# Patient Record
Sex: FEMALE | Race: BLACK OR AFRICAN AMERICAN | NOT HISPANIC OR LATINO | Employment: FULL TIME | URBAN - METROPOLITAN AREA
[De-identification: names, ages, dates, MRNs, and addresses within clinical notes are randomized per-mention and may not be internally consistent; named-entity substitution may affect disease eponyms.]

---

## 2023-03-22 ENCOUNTER — OFFICE VISIT (OUTPATIENT)
Dept: URGENT CARE | Facility: URGENT CARE | Age: 38
End: 2023-03-22
Payer: COMMERCIAL

## 2023-03-22 VITALS
DIASTOLIC BLOOD PRESSURE: 77 MMHG | SYSTOLIC BLOOD PRESSURE: 109 MMHG | OXYGEN SATURATION: 100 % | HEART RATE: 98 BPM | TEMPERATURE: 102.2 F | WEIGHT: 190 LBS

## 2023-03-22 DIAGNOSIS — R50.9 FEVER, UNSPECIFIED FEVER CAUSE: ICD-10-CM

## 2023-03-22 DIAGNOSIS — J10.1 INFLUENZA B: ICD-10-CM

## 2023-03-22 DIAGNOSIS — J02.0 STREP THROAT: Primary | ICD-10-CM

## 2023-03-22 LAB
DEPRECATED S PYO AG THROAT QL EIA: POSITIVE
FLUAV AG SPEC QL IA: NEGATIVE
FLUBV AG SPEC QL IA: POSITIVE

## 2023-03-22 PROCEDURE — 87880 STREP A ASSAY W/OPTIC: CPT | Performed by: PHYSICIAN ASSISTANT

## 2023-03-22 PROCEDURE — U0005 INFEC AGEN DETEC AMPLI PROBE: HCPCS | Performed by: PHYSICIAN ASSISTANT

## 2023-03-22 PROCEDURE — 99204 OFFICE O/P NEW MOD 45 MIN: CPT | Mod: CS | Performed by: PHYSICIAN ASSISTANT

## 2023-03-22 PROCEDURE — U0003 INFECTIOUS AGENT DETECTION BY NUCLEIC ACID (DNA OR RNA); SEVERE ACUTE RESPIRATORY SYNDROME CORONAVIRUS 2 (SARS-COV-2) (CORONAVIRUS DISEASE [COVID-19]), AMPLIFIED PROBE TECHNIQUE, MAKING USE OF HIGH THROUGHPUT TECHNOLOGIES AS DESCRIBED BY CMS-2020-01-R: HCPCS | Performed by: PHYSICIAN ASSISTANT

## 2023-03-22 PROCEDURE — 87804 INFLUENZA ASSAY W/OPTIC: CPT | Performed by: PHYSICIAN ASSISTANT

## 2023-03-22 RX ORDER — PENICILLIN V POTASSIUM 500 MG/1
500 TABLET, FILM COATED ORAL 2 TIMES DAILY
Qty: 20 TABLET | Refills: 0 | Status: SHIPPED | OUTPATIENT
Start: 2023-03-22 | End: 2023-04-01

## 2023-03-22 RX ORDER — IBUPROFEN 200 MG
400 TABLET ORAL ONCE
Status: COMPLETED | OUTPATIENT
Start: 2023-03-22 | End: 2023-03-22

## 2023-03-22 RX ORDER — OSELTAMIVIR PHOSPHATE 75 MG/1
75 CAPSULE ORAL 2 TIMES DAILY
Qty: 10 CAPSULE | Refills: 0 | Status: SHIPPED | OUTPATIENT
Start: 2023-03-22 | End: 2023-03-27

## 2023-03-22 RX ADMIN — Medication 400 MG: at 19:12

## 2023-03-22 NOTE — PROGRESS NOTES
Chief Complaint   Patient presents with     Cough     1 week,  dry cough, hurt chest when she cough - pt has been taken ibuprofen and cough syrup - last dose of ibuprofen was last night     Nasal Congestion     Yesterday, worsened     Headache     Noticed it last night, still going today     Chills     Pt feels really cold started yesterday, worsened today - Pt has not taken Covid test       ASSESSMENT/PLAN:  Nick was seen today for cough, nasal congestion, headache and chills.    Diagnoses and all orders for this visit:    Influenza B  -     oseltamivir (TAMIFLU) 75 MG capsule; Take 1 capsule (75 mg) by mouth 2 times daily for 5 days    Fever, unspecified fever cause  -     ibuprofen (ADVIL/MOTRIN) tablet 400 mg  -     Symptomatic COVID-19 Virus (Coronavirus) by PCR Nose  -     Streptococcus A Rapid Screen w/Reflex to PCR - Clinic Collect  -     Influenza A & B Antigen - Clinic Collect    Strep throat  -     penicillin V (VEETID) 500 MG tablet; Take 1 tablet (500 mg) by mouth 2 times daily for 10 days    Given ibuprofen in the clinic  Rapid strep positive, rapid flu positive  COVID pending  Start amoxicillin as above.  Continue Tylenol ibuprofen outpatient.  Push fluids, rest  Can also start Tamiflu if desired.  May shorten symptom duration.    Ethan Henry PA-C      SUBJECTIVE:  Nick is a 37 year old female who presents to urgent care with about a week of a cough and then some mild nasal congestion, certainly yesterday she developed worsening nasal congestion headache and onset of fever and chills and body aches.  Throat is irritated and hurts to swallow at times.  No shortness of breath or chest pain.    ROS: Pertinent ROS neg other than the symptoms noted above in the HPI.     OBJECTIVE:  /77 (BP Location: Left arm, Patient Position: Sitting, Cuff Size: Adult Regular)   Pulse 98   Temp (!) 102.2  F (39  C) (Tympanic)   Wt 86.2 kg (190 lb)   SpO2 100%    GENERAL: Tired, laying on the exam table,  feverish, nontoxic  EYES: Eyes grossly normal to inspection, PERRL and conjunctivae and sclerae normal  HENT: ear canals and TM's normal, nose and mouth without ulcers or lesions, no significant oropharynx erythema, nasal congestion  NECK: no adenopathy, nontender RESP: lungs clear to auscultation - no rales, rhonchi or wheezes  CV: regular rate and rhythm, normal S1 S2, no S3 or S4, no murmur, click or rub, no peripheral edema and peripheral pulses strong    DIAGNOSTICS    Results for orders placed or performed in visit on 03/22/23   Streptococcus A Rapid Screen w/Reflex to PCR - Clinic Collect     Status: Abnormal    Specimen: Throat; Swab   Result Value Ref Range    Group A Strep antigen Positive (A) Negative     No current outpatient medications on file.     No current facility-administered medications for this visit.      There is no problem list on file for this patient.     No past medical history on file.  No past surgical history on file.  No family history on file.  Social History     Tobacco Use     Smoking status: Not on file     Smokeless tobacco: Not on file   Substance Use Topics     Alcohol use: Not on file              The plan of care was discussed with the patient. They understand and agree with the course of treatment prescribed. A printed summary was given including instructions and medications.  The use of [employer] dictation services may have been used to construct the content in this note; any grammatical or spelling errors are non-intentional. Please contact the author of this note directly if you are in need of any clarification.

## 2023-03-23 LAB — SARS-COV-2 RNA RESP QL NAA+PROBE: NEGATIVE

## 2023-03-23 NOTE — PATIENT INSTRUCTIONS
Ibuprofen 400-600 mg (2-3 of the 200 mg OTC tablets or 400-600 mg of the children's liquid) up to 4 times daily with food or milk  Tylenol 500-1000 mg every 8 hours as needed

## 2023-08-17 ENCOUNTER — OFFICE VISIT (OUTPATIENT)
Dept: URGENT CARE | Facility: URGENT CARE | Age: 38
End: 2023-08-17
Payer: COMMERCIAL

## 2023-08-17 VITALS
HEART RATE: 72 BPM | TEMPERATURE: 97.2 F | WEIGHT: 173.9 LBS | RESPIRATION RATE: 16 BRPM | DIASTOLIC BLOOD PRESSURE: 73 MMHG | OXYGEN SATURATION: 99 % | SYSTOLIC BLOOD PRESSURE: 106 MMHG

## 2023-08-17 DIAGNOSIS — L29.9 PRURITUS: Primary | ICD-10-CM

## 2023-08-17 LAB
ALBUMIN SERPL BCG-MCNC: 4.2 G/DL (ref 3.5–5.2)
ALP SERPL-CCNC: 73 U/L (ref 35–104)
ALT SERPL W P-5'-P-CCNC: 7 U/L (ref 0–50)
ANION GAP SERPL CALCULATED.3IONS-SCNC: 12 MMOL/L (ref 7–15)
AST SERPL W P-5'-P-CCNC: 15 U/L (ref 0–45)
BASOPHILS # BLD AUTO: 0 10E3/UL (ref 0–0.2)
BASOPHILS NFR BLD AUTO: 0 %
BILIRUB SERPL-MCNC: 0.7 MG/DL
BUN SERPL-MCNC: 11.7 MG/DL (ref 6–20)
CALCIUM SERPL-MCNC: 9.4 MG/DL (ref 8.6–10)
CHLORIDE SERPL-SCNC: 106 MMOL/L (ref 98–107)
CREAT SERPL-MCNC: 0.71 MG/DL (ref 0.51–0.95)
DEPRECATED HCO3 PLAS-SCNC: 21 MMOL/L (ref 22–29)
EOSINOPHIL # BLD AUTO: 0.4 10E3/UL (ref 0–0.7)
EOSINOPHIL NFR BLD AUTO: 9 %
ERYTHROCYTE [DISTWIDTH] IN BLOOD BY AUTOMATED COUNT: 13.6 % (ref 10–15)
GFR SERPL CREATININE-BSD FRML MDRD: >90 ML/MIN/1.73M2
GLUCOSE SERPL-MCNC: 106 MG/DL (ref 70–99)
HCT VFR BLD AUTO: 34.1 % (ref 35–47)
HGB BLD-MCNC: 10.5 G/DL (ref 11.7–15.7)
IMM GRANULOCYTES # BLD: 0 10E3/UL
IMM GRANULOCYTES NFR BLD: 0 %
LYMPHOCYTES # BLD AUTO: 2.1 10E3/UL (ref 0.8–5.3)
LYMPHOCYTES NFR BLD AUTO: 45 %
MCH RBC QN AUTO: 26.1 PG (ref 26.5–33)
MCHC RBC AUTO-ENTMCNC: 30.8 G/DL (ref 31.5–36.5)
MCV RBC AUTO: 85 FL (ref 78–100)
MONOCYTES # BLD AUTO: 0.3 10E3/UL (ref 0–1.3)
MONOCYTES NFR BLD AUTO: 7 %
NEUTROPHILS # BLD AUTO: 1.8 10E3/UL (ref 1.6–8.3)
NEUTROPHILS NFR BLD AUTO: 39 %
PLATELET # BLD AUTO: 187 10E3/UL (ref 150–450)
POTASSIUM SERPL-SCNC: 3.8 MMOL/L (ref 3.4–5.3)
PROT SERPL-MCNC: 7.5 G/DL (ref 6.4–8.3)
RBC # BLD AUTO: 4.02 10E6/UL (ref 3.8–5.2)
SODIUM SERPL-SCNC: 139 MMOL/L (ref 136–145)
TSH SERPL DL<=0.005 MIU/L-ACNC: 1 UIU/ML (ref 0.3–4.2)
WBC # BLD AUTO: 4.7 10E3/UL (ref 4–11)

## 2023-08-17 PROCEDURE — 85025 COMPLETE CBC W/AUTO DIFF WBC: CPT | Performed by: PHYSICIAN ASSISTANT

## 2023-08-17 PROCEDURE — 99214 OFFICE O/P EST MOD 30 MIN: CPT | Performed by: PHYSICIAN ASSISTANT

## 2023-08-17 PROCEDURE — 84443 ASSAY THYROID STIM HORMONE: CPT | Performed by: PHYSICIAN ASSISTANT

## 2023-08-17 PROCEDURE — 36415 COLL VENOUS BLD VENIPUNCTURE: CPT | Performed by: PHYSICIAN ASSISTANT

## 2023-08-17 PROCEDURE — 80053 COMPREHEN METABOLIC PANEL: CPT | Performed by: PHYSICIAN ASSISTANT

## 2023-08-17 RX ORDER — HYDROXYZINE HYDROCHLORIDE 10 MG/1
10 TABLET, FILM COATED ORAL 3 TIMES DAILY PRN
Qty: 30 TABLET | Refills: 0 | Status: SHIPPED | OUTPATIENT
Start: 2023-08-17 | End: 2023-12-08

## 2023-08-17 ASSESSMENT — ENCOUNTER SYMPTOMS
CARDIOVASCULAR NEGATIVE: 1
CHEST TIGHTNESS: 0
WHEEZING: 0
CONSTITUTIONAL NEGATIVE: 1
CHILLS: 0
SHORTNESS OF BREATH: 0
COUGH: 0
PALPITATIONS: 0
FATIGUE: 0
WOUND: 0
FEVER: 0
RESPIRATORY NEGATIVE: 1
COLOR CHANGE: 0

## 2023-08-17 NOTE — PROGRESS NOTES
Kat Romero is a 37 year old, presenting for the following health issues:  Urgent Care and Derm Problem (For the past 2 months been having skin itching in back and back of hands, denies rash, tried luke warm water, Benadryl, and ice pack but not help)    HPI   Pruritis  Onset/Duration: 2months  Description  Location: back, hands  Character: none  Itching: moderate  Intensity:  moderate  Progression of Symptoms:  same  Accompanying signs and symptoms:   Fever: No  Body aches or joint pain: No  Sore throat symptoms: No  Recent cold symptoms: No  History:           Previous episodes of similar rash: None  New exposures:  None  Recent travel: No  Exposure to similar rash: No  Precipitating or alleviating factors: symptoms started after she had a liposuction surgery 2months ago but this was on her abdomen and not her back  Therapies tried and outcome: claritin, zyrtec, benadryl, non-scented products,ice, oils with minimal relief     There is no problem list on file for this patient.    No current outpatient medications on file.     No current facility-administered medications for this visit.      Allergies   Allergen Reactions    Aminoquinolines Itching and Nausea and Vomiting         Review of Systems   Constitutional: Negative.  Negative for chills, fatigue and fever.   Respiratory: Negative.  Negative for cough, chest tightness, shortness of breath and wheezing.    Cardiovascular: Negative.  Negative for chest pain, palpitations and peripheral edema.   Skin:  Negative for color change, pallor, rash and wound.   All other systems reviewed and are negative.           Objective    /73 (BP Location: Left arm, Patient Position: Sitting, Cuff Size: Adult Regular)   Pulse 72   Temp 97.2  F (36.2  C) (Tympanic)   Resp 16   Wt 78.9 kg (173 lb 14.4 oz)   SpO2 99%   There is no height or weight on file to calculate BMI.  Physical Exam  Vitals and nursing note reviewed.   Constitutional:       General: She is  not in acute distress.     Appearance: Normal appearance. She is well-developed and normal weight. She is not ill-appearing.   Skin:     General: Skin is warm and dry.      Findings: No abrasion, abscess, acne, bruising, burn, ecchymosis, erythema, signs of injury, laceration, lesion, petechiae, rash or wound.   Neurological:      Mental Status: She is alert and oriented to person, place, and time.   Psychiatric:         Mood and Affect: Mood normal.         Behavior: Behavior normal.         Thought Content: Thought content normal.         Judgment: Judgment normal.          Assessment/Plan:  Pruritus:  No rashes or lesions present on exam.  ?metabolic vs derm vs psychogenic.  Will check labs below and give trial of hydroxyzine as needed for itchy skin. Avoid triggers and irritating agents.  Avoid scratching to prevent secondary infection.  Will also send to DERM as well.  Recheck in clinic if symptoms worsen or if symptoms do not improve.  -     Adult Dermatology Referral  -     CBC with platelets and differential; Future  -     TSH with free T4 reflex; Future  -     Comprehensive metabolic panel (BMP + Alb, Alk Phos, ALT, AST, Total. Bili, TP); Future  -     hydrOXYzine (ATARAX) 10 MG tablet; Take 1 tablet (10 mg) by mouth 3 times daily as needed for itching  -     CBC with platelets and differential  -     TSH with free T4 reflex  -     Comprehensive metabolic panel (BMP + Alb, Alk Phos, ALT, AST, Total. Bili, TP)        Kisha Bahena PA-C

## 2023-12-08 ENCOUNTER — OFFICE VISIT (OUTPATIENT)
Dept: URGENT CARE | Facility: URGENT CARE | Age: 38
End: 2023-12-08
Payer: COMMERCIAL

## 2023-12-08 VITALS
OXYGEN SATURATION: 100 % | HEART RATE: 72 BPM | RESPIRATION RATE: 16 BRPM | TEMPERATURE: 98.2 F | DIASTOLIC BLOOD PRESSURE: 74 MMHG | SYSTOLIC BLOOD PRESSURE: 116 MMHG | WEIGHT: 176.7 LBS

## 2023-12-08 DIAGNOSIS — H10.023 PINK EYE DISEASE OF BOTH EYES: Primary | ICD-10-CM

## 2023-12-08 PROCEDURE — 99213 OFFICE O/P EST LOW 20 MIN: CPT | Performed by: PHYSICIAN ASSISTANT

## 2023-12-08 RX ORDER — POLYMYXIN B SULFATE AND TRIMETHOPRIM 1; 10000 MG/ML; [USP'U]/ML
1 SOLUTION OPHTHALMIC EVERY 6 HOURS
Qty: 2 ML | Refills: 0 | Status: SHIPPED | OUTPATIENT
Start: 2023-12-08 | End: 2023-12-15

## 2023-12-08 NOTE — PROGRESS NOTES
Chief Complaint   Patient presents with    Eye Problem     Pain, redness, discharge. Onset- 1 week                ASSESSMENT:     ICD-10-CM    1. Pink eye disease of both eyes  H10.023 polymixin b-trimethoprim (POLYTRIM) 74184-7.1 UNIT/ML-% ophthalmic solution            PLAN: [place].  Antibiotic eyedrops.  Recheck 3 days if not better.  Avoid eye make-up.  Throw out any eye make-up over 6 months of age.  I have discussed clinical findings with patient.  Side effects of medications discussed.  Symptomatic care is discussed.  I have discussed the possibility of  worsening symptoms and indication to RTC or go to the ER if they occur.  All questions are answered, patient indicates understanding of these issues and is in agreement with plan.   Patient care instructions are discussed/given at the end of visit.   Melida Mcdaniels PA-C      SUBJECTIVE:  38-year-old female with red goopy eyes for 1 week.  No decreased vision.  No photophobia.  Irritation present.  Son with eye symptoms and thick nasal discharge.      Allergies   Allergen Reactions    Aminoquinolines Itching and Nausea and Vomiting       No past medical history on file.    hydrOXYzine (ATARAX) 10 MG tablet, Take 1 tablet (10 mg) by mouth 3 times daily as needed for itching (Patient not taking: Reported on 12/8/2023)    No current facility-administered medications on file prior to visit.      Social History     Tobacco Use    Smoking status: Never    Smokeless tobacco: Never   Substance Use Topics    Alcohol use: Never       ROS:  CONSTITUTIONAL: Negative for fatigue or fever.  EYES: as above.  ENT: Neg for ST.  RESP: Neg for SOB.  CV: Negative for chest pains.  GI: Negative for vomiting.  MUSCULOSKELETAL:  Negative for significant muscle or joint pains.  NEUROLOGIC: Negative for headaches.  SKIN: Negative for rash.  PSYCH: Normal mentation for age.    OBJECTIVE:  /74 (BP Location: Left arm, Patient Position: Sitting, Cuff Size: Adult Regular)    Pulse 72   Temp 98.2  F (36.8  C) (Tympanic)   Resp 16   Wt 80.2 kg (176 lb 11.2 oz)   SpO2 100%   GENERAL APPEARANCE: Healthy, alert and no distress.  EYES:Conjunctiva/sclera with mild injection bilaterally.  Pupils equal reactive to light and accommodation.  EOMs intact without pain.    NECK: Moving head and neck freely   RESP: Breathing comfortably   NEURO: Awake, alert    SKIN: No rashes        REBECCA CrawfordC

## 2025-03-03 ENCOUNTER — ANCILLARY PROCEDURE (OUTPATIENT)
Dept: GENERAL RADIOLOGY | Facility: CLINIC | Age: 40
End: 2025-03-03
Attending: EMERGENCY MEDICINE
Payer: COMMERCIAL

## 2025-03-03 ENCOUNTER — OFFICE VISIT (OUTPATIENT)
Dept: URGENT CARE | Facility: URGENT CARE | Age: 40
End: 2025-03-03
Payer: COMMERCIAL

## 2025-03-03 VITALS
HEART RATE: 73 BPM | OXYGEN SATURATION: 100 % | DIASTOLIC BLOOD PRESSURE: 72 MMHG | TEMPERATURE: 98.5 F | SYSTOLIC BLOOD PRESSURE: 108 MMHG | WEIGHT: 183 LBS | RESPIRATION RATE: 18 BRPM

## 2025-03-03 DIAGNOSIS — J18.9 PNEUMONIA OF RIGHT LOWER LOBE DUE TO INFECTIOUS ORGANISM: Primary | ICD-10-CM

## 2025-03-03 DIAGNOSIS — J06.9 UPPER RESPIRATORY TRACT INFECTION, UNSPECIFIED TYPE: ICD-10-CM

## 2025-03-03 DIAGNOSIS — K13.0 SORE OF LIP: ICD-10-CM

## 2025-03-03 LAB — DEPRECATED S PYO AG THROAT QL EIA: NEGATIVE

## 2025-03-03 PROCEDURE — 87651 STREP A DNA AMP PROBE: CPT | Performed by: EMERGENCY MEDICINE

## 2025-03-03 PROCEDURE — 71046 X-RAY EXAM CHEST 2 VIEWS: CPT | Mod: TC | Performed by: RADIOLOGY

## 2025-03-03 PROCEDURE — 3078F DIAST BP <80 MM HG: CPT | Performed by: EMERGENCY MEDICINE

## 2025-03-03 PROCEDURE — 3074F SYST BP LT 130 MM HG: CPT | Performed by: EMERGENCY MEDICINE

## 2025-03-03 PROCEDURE — 99214 OFFICE O/P EST MOD 30 MIN: CPT | Performed by: EMERGENCY MEDICINE

## 2025-03-03 RX ORDER — AZITHROMYCIN 250 MG/1
TABLET, FILM COATED ORAL
Qty: 6 TABLET | Refills: 0 | Status: SHIPPED | OUTPATIENT
Start: 2025-03-03 | End: 2025-03-08

## 2025-03-03 RX ORDER — AMOXICILLIN 875 MG/1
875 TABLET, COATED ORAL 3 TIMES DAILY
Qty: 15 TABLET | Refills: 0 | Status: SHIPPED | OUTPATIENT
Start: 2025-03-03 | End: 2025-03-08

## 2025-03-03 NOTE — PROGRESS NOTES
Assessment & Plan     Diagnosis:    ICD-10-CM    1. Pneumonia of right lower lobe due to infectious organism  J18.9 XR Chest 2 Views     Streptococcus A Rapid Screen w/Reflex to PCR - Clinic Collect     Group A Streptococcus PCR Throat Swab     azithromycin (ZITHROMAX) 250 MG tablet     amoxicillin (AMOXIL) 875 MG tablet      2. Sore of lip  K13.0 XR Chest 2 Views     Streptococcus A Rapid Screen w/Reflex to PCR - Clinic Collect     Group A Streptococcus PCR Throat Swab        Medical Decision Making:  Nick Her is a 39 year old female who presents for evaluation of cough, fever, congestion, shortness of breath.  History, physical exam and imaging studies are consistent with pneumonia.   There are no signs of complications of pneumonia at this point such as septic shock, bacteremia, empyema, hypoxia, respiratory failure or compromise.  Supportive outpatient management is therefore indicated.  I will not therefore hospitalize for further cares or IV antibiotics.  This seems to be community acquired pneumonia and there are no risk factors at this point for coverage of antibiotic-resistant strains.  Patient will follow-up with primary care physician regardless of disease course within 2 days maximum.  Signs for visit to ED were discussed with patient and pneumonia precautions given for home.      LOKESH Randle [facility]    Subjective     Nick Her is a 39 year old female who presents to clinic today for the following health issues:  Chief Complaint   Patient presents with    Urgent Care    URI     Per patient states a week ago she had a severe URI that she treated at home with high fevers that has resolved but now having SOB and weakness        HPI    Patient with 1 week of severe cough, upper respiratory symptoms including sore throat, rash breaking out on her lips. Fever subsided a few days ago but felt she might be getting a new fever today now. Sore throat has continued but  improved, rash on her lips does appear to be improving as well.  She is concerned as she started feeling suddenly more short of breath yesterday, feeling more weak, rundown and tired.  No history of asthma or lung problems.  He notes no wheezing, chest pain, difficulty swallowing.    Review of Systems    See HPI    Objective      Vitals: /72   Pulse 73   Temp 98.5  F (36.9  C) (Tympanic)   Resp 18   Wt 83 kg (183 lb)   LMP 02/26/2025   SpO2 100%       Patient Vitals for the past 24 hrs:   BP Temp Temp src Pulse Resp SpO2 Weight   03/03/25 1746 108/72 98.5  F (36.9  C) Tympanic 73 18 100 % 83 kg (183 lb)       Vital signs reviewed by: Ney Maloney PA-C    Physical Exam   Constitutional: Patient is alert and cooperative. No acute distress.  Ears: Right TM is normal. Left TM is normal. External ear canals are normal.  Mouth: Mucous membranes are moist. Normal tongue and tonsil. Posterior oropharynx is clear.  Eyes: Conjunctivae, EOMI and lids are normal. PERRL.  Cardiovascular: Regular rate and rhythm  Pulmonary/Chest: Crackles in the right lower lobe remaining lung fields are clear.  No wheezing or rhonchi.;  Effort normal.  Speaking in full sentences, no respiratory distress.  Skin: No rash noted on visualized skin.  Psychiatric:The patient has a normal mood and affect.     Labs/Imaging:  Results for orders placed or performed in visit on 03/03/25   XR Chest 2 Views     Status: None    Narrative    EXAM: XR CHEST 2 VIEWS  LOCATION: [facility]  DATE: 3/3/2025    INDICATION:  Upper respiratory tract infection, unspecified type, Sore of lip  COMPARISON: None.      Impression    IMPRESSION: Heart size is normal. There is some increased groundglass opacity in the medial aspect of the right lower lobe suspicious for developing pneumonia. Upper lobes are clear. No effusions or pneumothorax. No acute bony abnormalities.   Results for orders placed or performed in visit on 03/03/25    Streptococcus A Rapid Screen w/Reflex to PCR - Clinic Collect     Status: Normal    Specimen: Throat; Swab   Result Value Ref Range    Group A Strep antigen Negative Negative       Ney Maloney PA-C, March 3, 2025

## 2025-03-04 LAB — S PYO DNA THROAT QL NAA+PROBE: NOT DETECTED
